# Patient Record
Sex: FEMALE | Race: WHITE | NOT HISPANIC OR LATINO
[De-identification: names, ages, dates, MRNs, and addresses within clinical notes are randomized per-mention and may not be internally consistent; named-entity substitution may affect disease eponyms.]

---

## 2018-05-01 ENCOUNTER — CHARTING TRANS (OUTPATIENT)
Dept: OTHER | Age: 48
End: 2018-05-01

## 2018-11-01 VITALS
TEMPERATURE: 98.3 F | HEIGHT: 62 IN | SYSTOLIC BLOOD PRESSURE: 112 MMHG | DIASTOLIC BLOOD PRESSURE: 72 MMHG | WEIGHT: 179.99 LBS | OXYGEN SATURATION: 98 % | BODY MASS INDEX: 33.12 KG/M2 | RESPIRATION RATE: 17 BRPM | HEART RATE: 99 BPM

## 2019-01-02 ENCOUNTER — WALK IN (OUTPATIENT)
Dept: URGENT CARE | Age: 49
End: 2019-01-02

## 2019-01-02 VITALS
RESPIRATION RATE: 16 BRPM | SYSTOLIC BLOOD PRESSURE: 122 MMHG | HEIGHT: 62 IN | BODY MASS INDEX: 33.13 KG/M2 | DIASTOLIC BLOOD PRESSURE: 80 MMHG | HEART RATE: 84 BPM | OXYGEN SATURATION: 99 % | TEMPERATURE: 98.6 F | WEIGHT: 180 LBS

## 2019-01-02 DIAGNOSIS — J06.9 ACUTE URI: Primary | ICD-10-CM

## 2019-01-02 PROCEDURE — X0943 AMG SELF PAY VISIT: HCPCS | Performed by: NURSE PRACTITIONER

## 2019-01-02 ASSESSMENT — ENCOUNTER SYMPTOMS
HEADACHES: 1
NAUSEA: 0
SHORTNESS OF BREATH: 0
FATIGUE: 1
CHILLS: 1
GASTROINTESTINAL NEGATIVE: 1
SINUS PAIN: 1
EYES NEGATIVE: 1
SINUS PRESSURE: 1
RHINORRHEA: 1
SWOLLEN GLANDS: 0
FEVER: 1
WHEEZING: 0
COUGH: 1

## 2019-05-03 ENCOUNTER — HOSPITAL ENCOUNTER (OUTPATIENT)
Age: 49
Discharge: HOME | End: 2019-05-03
Attending: FAMILY MEDICINE

## 2019-05-03 VITALS
HEIGHT: 62 IN | DIASTOLIC BLOOD PRESSURE: 73 MMHG | WEIGHT: 202 LBS | RESPIRATION RATE: 18 BRPM | HEART RATE: 93 BPM | BODY MASS INDEX: 37.17 KG/M2 | OXYGEN SATURATION: 100 % | TEMPERATURE: 99 F | SYSTOLIC BLOOD PRESSURE: 141 MMHG

## 2019-05-03 DIAGNOSIS — R22.2 ABDOMINAL WALL MASS: Primary | ICD-10-CM

## 2019-05-03 PROCEDURE — 99202 OFFICE O/P NEW SF 15 MIN: CPT

## 2019-05-03 NOTE — ED PROVIDER NOTES
Patient Seen in: [address]    History   Patient presents with:  Lump Mass (integumentary)    Stated Complaint: Lump on Left Side and lower back area     HPI    53-year-old female presents with a lump on the left flank t Normal rate and regular rhythm. Pulmonary/Chest: Effort normal and breath sounds normal.   Abdominal: Soft. Bowel sounds are normal. She exhibits mass (left flank 3 cm x 2 cm firm and mobile ). She exhibits no distension. There is no tenderness.  There is

## 2019-05-03 NOTE — ED NOTES
Pt discharged home stable and in good condition by self. Reviewed AVS. Follow up as indicated. Pt verbalized understanding and agreed.

## 2025-04-16 ENCOUNTER — HOSPITAL ENCOUNTER (OUTPATIENT)
Age: 55
Discharge: ANOTHER HOSPITAL | End: 2025-04-16
Payer: COMMERCIAL

## 2025-04-16 VITALS
RESPIRATION RATE: 20 BRPM | DIASTOLIC BLOOD PRESSURE: 86 MMHG | TEMPERATURE: 99 F | SYSTOLIC BLOOD PRESSURE: 141 MMHG | HEART RATE: 123 BPM | OXYGEN SATURATION: 98 %

## 2025-04-16 DIAGNOSIS — E11.9 DIABETES MELLITUS, NEW ONSET (HCC): ICD-10-CM

## 2025-04-16 DIAGNOSIS — E87.20 ACIDOSIS: Primary | ICD-10-CM

## 2025-04-16 DIAGNOSIS — E87.5 HYPERKALEMIA: ICD-10-CM

## 2025-04-16 LAB
#MXD IC: 0.3 X10ˆ3/UL (ref 0.1–1)
BUN BLD-MCNC: 25 MG/DL (ref 7–18)
CHLORIDE BLD-SCNC: 109 MMOL/L (ref 98–112)
CO2 BLD-SCNC: 12 MMOL/L (ref 21–32)
CREAT BLD-MCNC: 0.9 MG/DL (ref 0.55–1.02)
EGFRCR SERPLBLD CKD-EPI 2021: 77 ML/MIN/1.73M2 (ref 60–?)
GLUCOSE BLD-MCNC: >560 MG/DL (ref 70–99)
HCT VFR BLD AUTO: 51 % (ref 35–48)
HCT VFR BLD CALC: 56 % (ref 34–50)
HGB BLD-MCNC: 16.4 G/DL (ref 12–16)
ISTAT IONIZED CALCIUM FOR CHEM 8: 1.31 MMOL/L (ref 1.12–1.32)
LYMPHOCYTES # BLD AUTO: 1.2 X10ˆ3/UL (ref 1–4)
LYMPHOCYTES NFR BLD AUTO: 10 %
MCH RBC QN AUTO: 28.6 PG (ref 26–34)
MCHC RBC AUTO-ENTMCNC: 32.2 G/DL (ref 31–37)
MCV RBC AUTO: 88.9 FL (ref 80–100)
MIXED CELL %: 2.8 %
NEUTROPHILS # BLD AUTO: 10.8 X10ˆ3/UL (ref 1.5–7.7)
NEUTROPHILS NFR BLD AUTO: 87.2 %
PLATELET # BLD AUTO: 383 X10ˆ3/UL (ref 150–450)
POCT INFLUENZA A: NEGATIVE
POCT INFLUENZA B: NEGATIVE
POTASSIUM BLD-SCNC: 5.2 MMOL/L (ref 3.6–5.1)
RBC # BLD AUTO: 5.74 X10ˆ6/UL (ref 3.8–5.3)
SODIUM BLD-SCNC: 137 MMOL/L (ref 136–145)
WBC # BLD AUTO: 12.3 X10ˆ3/UL (ref 4–11)

## 2025-04-16 PROCEDURE — 87502 INFLUENZA DNA AMP PROBE: CPT | Performed by: NURSE PRACTITIONER

## 2025-04-16 PROCEDURE — 80047 BASIC METABLC PNL IONIZED CA: CPT | Performed by: NURSE PRACTITIONER

## 2025-04-16 PROCEDURE — 99205 OFFICE O/P NEW HI 60 MIN: CPT | Performed by: NURSE PRACTITIONER

## 2025-04-16 PROCEDURE — 96361 HYDRATE IV INFUSION ADD-ON: CPT | Performed by: NURSE PRACTITIONER

## 2025-04-16 PROCEDURE — 85025 COMPLETE CBC W/AUTO DIFF WBC: CPT | Performed by: NURSE PRACTITIONER

## 2025-04-16 PROCEDURE — 96374 THER/PROPH/DIAG INJ IV PUSH: CPT | Performed by: NURSE PRACTITIONER

## 2025-04-16 RX ORDER — ONDANSETRON 2 MG/ML
4 INJECTION INTRAMUSCULAR; INTRAVENOUS ONCE
Status: COMPLETED | OUTPATIENT
Start: 2025-04-16 | End: 2025-04-16

## 2025-04-16 RX ORDER — SODIUM CHLORIDE 9 MG/ML
1000 INJECTION, SOLUTION INTRAVENOUS ONCE
Status: COMPLETED | OUTPATIENT
Start: 2025-04-16 | End: 2025-04-16

## 2025-04-16 NOTE — ED INITIAL ASSESSMENT (HPI)
Pt states having some sinus pressure and congestion. Pt states Sunday began having vomiting. Pt denies diarrhea. Pt states has felt feverish but only low grade. Pt states having some post nasal drip. Pt states having lack of appetite.

## 2025-04-16 NOTE — DISCHARGE INSTRUCTIONS
Your blood sugar was dangerously high today, reading off the high end of our scale.  Your other blood test showed that you are a condition called acidosis.  This can be life-threatening.  You will need resources beyond what is available in the immediate care center to reduce your blood sugars to a healthy level.  For this reason you must go immediately to the emergency department for further evaluation and treatment.